# Patient Record
Sex: FEMALE | Race: WHITE | NOT HISPANIC OR LATINO | URBAN - METROPOLITAN AREA
[De-identification: names, ages, dates, MRNs, and addresses within clinical notes are randomized per-mention and may not be internally consistent; named-entity substitution may affect disease eponyms.]

---

## 2021-07-16 ENCOUNTER — OFFICE VISIT (OUTPATIENT)
Dept: URBAN - METROPOLITAN AREA CLINIC 98 | Facility: CLINIC | Age: 56
End: 2021-07-16
Payer: COMMERCIAL

## 2021-07-16 ENCOUNTER — WEB ENCOUNTER (OUTPATIENT)
Dept: URBAN - METROPOLITAN AREA CLINIC 98 | Facility: CLINIC | Age: 56
End: 2021-07-16

## 2021-07-16 VITALS
BODY MASS INDEX: 22.29 KG/M2 | HEIGHT: 67 IN | DIASTOLIC BLOOD PRESSURE: 78 MMHG | HEART RATE: 64 BPM | WEIGHT: 142 LBS | SYSTOLIC BLOOD PRESSURE: 127 MMHG | TEMPERATURE: 97.3 F

## 2021-07-16 DIAGNOSIS — N94.10 DYSPAREUNIA IN FEMALE: ICD-10-CM

## 2021-07-16 DIAGNOSIS — Q61.3 POLYCYSTIC KIDNEY DISEASE: ICD-10-CM

## 2021-07-16 DIAGNOSIS — Q44.6 PLD (POLYCYSTIC LIVER DISEASE): ICD-10-CM

## 2021-07-16 PROCEDURE — G9903 PT SCRN TBCO ID AS NON USER: HCPCS | Performed by: INTERNAL MEDICINE

## 2021-07-16 PROCEDURE — G8482 FLU IMMUNIZE ORDER/ADMIN: HCPCS | Performed by: INTERNAL MEDICINE

## 2021-07-16 PROCEDURE — 3017F COLORECTAL CA SCREEN DOC REV: CPT | Performed by: INTERNAL MEDICINE

## 2021-07-16 PROCEDURE — 1036F TOBACCO NON-USER: CPT | Performed by: INTERNAL MEDICINE

## 2021-07-16 PROCEDURE — G8427 DOCREV CUR MEDS BY ELIG CLIN: HCPCS | Performed by: INTERNAL MEDICINE

## 2021-07-16 PROCEDURE — G8420 CALC BMI NORM PARAMETERS: HCPCS | Performed by: INTERNAL MEDICINE

## 2021-07-16 PROCEDURE — 99243 OFF/OP CNSLTJ NEW/EST LOW 30: CPT | Performed by: INTERNAL MEDICINE

## 2021-07-16 RX ORDER — ESTRADIOL 10 UG/1
1 TABLET TABLET VAGINAL
Status: ACTIVE | COMMUNITY

## 2021-07-16 NOTE — HPI-TODAY'S VISIT:
Here on referral from Dr Saroj Stephens for abnormal liver imaging, a copy of this note will be sent to him upon completion.   3 yrs ago- had epigastric discomfort - felt a lump around xiphoid  did scan - diagnosed PKD  but not told of liver cysts until now  recently - with some increase in pain and discomfort in upper abdomen  no trouble eating; bm ok. . on Yuvafem x 2 months is helping with vaginal dryness however is having pain with sexual activity

## 2021-07-16 NOTE — PHYSICAL EXAM GASTROINTESTINAL
Abdomen, soft, nontender, nondistended, hepatomegaly with liver edge and cyst palpable in left upper quadrant and 1 cm below costal margin

## 2021-07-17 LAB
A/G RATIO: 2.2
ALBUMIN: 4.6
ALKALINE PHOSPHATASE: 90
ALT (SGPT): 17
AST (SGOT): 20
BASO (ABSOLUTE): 0
BASOS: 0
BILIRUBIN, TOTAL: 0.5
BUN/CREATININE RATIO: 18
BUN: 13
CALCIUM: 9.2
CARBON DIOXIDE, TOTAL: 25
CHLORIDE: 104
CREATININE: 0.74
EGFR IF AFRICN AM: 105
EGFR IF NONAFRICN AM: 91
EOS (ABSOLUTE): 0.1
EOS: 2
GGT: 56
GLOBULIN, TOTAL: 2.1
GLUCOSE: 85
HBSAG SCREEN: NEGATIVE
HCV AB: <0.1
HEMATOCRIT: 41.9
HEMATOLOGY COMMENTS:: (no result)
HEMOGLOBIN: 13.9
HEP A AB, TOTAL: NEGATIVE
HEP B CORE AB, TOT: NEGATIVE
HEPATITIS B SURF AB QUANT: <3.1
IMMATURE CELLS: (no result)
IMMATURE GRANS (ABS): 0
IMMATURE GRANULOCYTES: 0
INTERPRETATION:: (no result)
LYMPHS (ABSOLUTE): 1.7
LYMPHS: 37
MCH: 31.7
MCHC: 33.2
MCV: 95
MONOCYTES(ABSOLUTE): 0.4
MONOCYTES: 9
NEUTROPHILS (ABSOLUTE): 2.3
NEUTROPHILS: 52
NRBC: (no result)
PLATELETS: 182
POTASSIUM: 4.6
PROTEIN, TOTAL: 6.7
RBC: 4.39
RDW: 13.3
SODIUM: 142
WBC: 4.5

## 2021-07-29 ENCOUNTER — LAB OUTSIDE AN ENCOUNTER (OUTPATIENT)
Dept: URBAN - METROPOLITAN AREA CLINIC 98 | Facility: CLINIC | Age: 56
End: 2021-07-29

## 2021-07-29 LAB
CREATININE POC: 1
PERFORMING LAB: (no result)

## 2021-08-02 ENCOUNTER — TELEPHONE ENCOUNTER (OUTPATIENT)
Dept: URBAN - METROPOLITAN AREA CLINIC 98 | Facility: CLINIC | Age: 56
End: 2021-08-02

## 2022-10-04 ENCOUNTER — P2P PATIENT RECORD (OUTPATIENT)
Age: 57
End: 2022-10-04

## 2023-02-07 ENCOUNTER — LAB OUTSIDE AN ENCOUNTER (OUTPATIENT)
Dept: URBAN - METROPOLITAN AREA CLINIC 98 | Facility: CLINIC | Age: 58
End: 2023-02-07

## 2023-02-07 ENCOUNTER — OFFICE VISIT (OUTPATIENT)
Dept: URBAN - METROPOLITAN AREA CLINIC 98 | Facility: CLINIC | Age: 58
End: 2023-02-07
Payer: COMMERCIAL

## 2023-02-07 VITALS
BODY MASS INDEX: 23.45 KG/M2 | SYSTOLIC BLOOD PRESSURE: 141 MMHG | TEMPERATURE: 97.1 F | DIASTOLIC BLOOD PRESSURE: 76 MMHG | WEIGHT: 149.4 LBS | HEART RATE: 65 BPM | HEIGHT: 67 IN

## 2023-02-07 DIAGNOSIS — Q61.3 POLYCYSTIC KIDNEY DISEASE: ICD-10-CM

## 2023-02-07 DIAGNOSIS — Q44.6 PLD (POLYCYSTIC LIVER DISEASE): ICD-10-CM

## 2023-02-07 PROCEDURE — 99213 OFFICE O/P EST LOW 20 MIN: CPT | Performed by: INTERNAL MEDICINE

## 2023-02-07 RX ORDER — ESTRADIOL 10 UG/1
1 TABLET TABLET VAGINAL
Status: ACTIVE | COMMUNITY

## 2023-02-07 NOTE — HPI-TODAY'S VISIT:
Here to have her annual follow up for liver cysts no issues to report  pain when she leans over - mild and unchanged  slight early satiety at night had painful intercourse  OB GYN did US : no issues

## 2023-02-08 LAB
A/G RATIO: 2
ALBUMIN: 4.5
ALKALINE PHOSPHATASE: 90
ALT (SGPT): 21
AST (SGOT): 25
BASO (ABSOLUTE): 0
BASOS: 0
BILIRUBIN, TOTAL: 0.6
BUN/CREATININE RATIO: 18
BUN: 13
CALCIUM: 9.3
CARBON DIOXIDE, TOTAL: 25
CHLORIDE: 102
CREATININE: 0.74
EGFR: 94
EOS (ABSOLUTE): 0
EOS: 1
GLOBULIN, TOTAL: 2.2
GLUCOSE: 88
HEMATOCRIT: 41.4
HEMATOLOGY COMMENTS:: (no result)
HEMOGLOBIN: 13.9
IMMATURE CELLS: (no result)
IMMATURE GRANS (ABS): 0
IMMATURE GRANULOCYTES: 0
LYMPHS (ABSOLUTE): 2.4
LYMPHS: 34
MCH: 31.2
MCHC: 33.6
MCV: 93
MONOCYTES(ABSOLUTE): 0.6
MONOCYTES: 8
NEUTROPHILS (ABSOLUTE): 4.1
NEUTROPHILS: 57
NRBC: (no result)
PLATELETS: 206
POTASSIUM: 4
PROTEIN, TOTAL: 6.7
RBC: 4.45
RDW: 13.3
SODIUM: 139
WBC: 7.2

## 2023-02-09 ENCOUNTER — TELEPHONE ENCOUNTER (OUTPATIENT)
Dept: URBAN - METROPOLITAN AREA CLINIC 98 | Facility: CLINIC | Age: 58
End: 2023-02-09

## 2023-02-09 PROBLEM — 253883006: Status: ACTIVE | Noted: 2021-07-16

## 2023-02-09 PROBLEM — 81712001 PAIN IN FEMALE GENITALIA ON INTERCOURSE: Status: ACTIVE | Noted: 2023-02-05

## 2023-02-09 PROBLEM — 72925005 CONGENITAL CYSTIC DISEASE OF LIVER: Status: ACTIVE | Noted: 2023-02-05

## 2023-06-01 ENCOUNTER — P2P PATIENT RECORD (OUTPATIENT)
Age: 58
End: 2023-06-01

## 2024-02-09 ENCOUNTER — OV EP (OUTPATIENT)
Dept: URBAN - METROPOLITAN AREA CLINIC 98 | Facility: CLINIC | Age: 59
End: 2024-02-09
Payer: COMMERCIAL

## 2024-02-09 VITALS
WEIGHT: 148.4 LBS | HEIGHT: 67 IN | BODY MASS INDEX: 23.29 KG/M2 | TEMPERATURE: 97.1 F | DIASTOLIC BLOOD PRESSURE: 79 MMHG | SYSTOLIC BLOOD PRESSURE: 135 MMHG | HEART RATE: 69 BPM

## 2024-02-09 DIAGNOSIS — Q44.6 PLD (POLYCYSTIC LIVER DISEASE): ICD-10-CM

## 2024-02-09 DIAGNOSIS — Q61.3 POLYCYSTIC KIDNEY DISEASE: ICD-10-CM

## 2024-02-09 PROCEDURE — 99213 OFFICE O/P EST LOW 20 MIN: CPT | Performed by: INTERNAL MEDICINE

## 2024-02-09 RX ORDER — ESTRADIOL 10 UG/1
1 TABLET TABLET VAGINAL
Status: ACTIVE | COMMUNITY

## 2024-02-09 NOTE — PHYSICAL EXAM GASTROINTESTINAL
Abdomen, soft, nontender, epigastric distended, + hepatomegaly
Inadequate energy intake.../Loss of subcutaneous fat.../Loss of muscle...

## 2024-02-09 NOTE — HPI-TODAY'S VISIT:
Here to follow up for polycystic disease  no change in symptoms last mri imaging 2021  Seeing PA in [facility] now (Dr Perrin no longer in practice)  1/24/24 Labs done alt 28 ast 26 alp 84 alb 4.3 cr 0.83 na 143 plt 186 hb 13 no

## 2024-12-04 ENCOUNTER — LAB OUTSIDE AN ENCOUNTER (OUTPATIENT)
Dept: URBAN - METROPOLITAN AREA CLINIC 98 | Facility: CLINIC | Age: 59
End: 2024-12-04

## 2024-12-04 ENCOUNTER — WEB ENCOUNTER (OUTPATIENT)
Dept: URBAN - METROPOLITAN AREA CLINIC 98 | Facility: CLINIC | Age: 59
End: 2024-12-04

## 2024-12-17 ENCOUNTER — LAB OUTSIDE AN ENCOUNTER (OUTPATIENT)
Dept: URBAN - METROPOLITAN AREA CLINIC 98 | Facility: CLINIC | Age: 59
End: 2024-12-17

## 2024-12-18 ENCOUNTER — TELEPHONE ENCOUNTER (OUTPATIENT)
Dept: URBAN - METROPOLITAN AREA CLINIC 98 | Facility: CLINIC | Age: 59
End: 2024-12-18

## 2025-02-18 ENCOUNTER — P2P PATIENT RECORD (OUTPATIENT)
Age: 60
End: 2025-02-18

## 2025-02-21 ENCOUNTER — OFFICE VISIT (OUTPATIENT)
Dept: URBAN - METROPOLITAN AREA CLINIC 98 | Facility: CLINIC | Age: 60
End: 2025-02-21

## 2025-02-24 ENCOUNTER — OFFICE VISIT (OUTPATIENT)
Dept: URBAN - METROPOLITAN AREA CLINIC 98 | Facility: CLINIC | Age: 60
End: 2025-02-24
Payer: COMMERCIAL

## 2025-02-24 ENCOUNTER — DASHBOARD ENCOUNTERS (OUTPATIENT)
Age: 60
End: 2025-02-24

## 2025-02-24 VITALS
HEART RATE: 65 BPM | WEIGHT: 150 LBS | TEMPERATURE: 97.2 F | BODY MASS INDEX: 23.54 KG/M2 | DIASTOLIC BLOOD PRESSURE: 71 MMHG | SYSTOLIC BLOOD PRESSURE: 113 MMHG | HEIGHT: 67 IN

## 2025-02-24 DIAGNOSIS — Q44.6 PLD (POLYCYSTIC LIVER DISEASE): ICD-10-CM

## 2025-02-24 DIAGNOSIS — Q61.3 POLYCYSTIC KIDNEY DISEASE: ICD-10-CM

## 2025-02-24 PROCEDURE — 99213 OFFICE O/P EST LOW 20 MIN: CPT | Performed by: INTERNAL MEDICINE

## 2025-02-24 RX ORDER — ESTRADIOL 10 UG/1
1 TABLET TABLET VAGINAL
Status: ACTIVE | COMMUNITY

## 2025-02-24 NOTE — HPI-TODAY'S VISIT:
Here for annual follow up for polycystic liver disease  no complaints  leaning over to tie shoe : will have mild discomfort MRI [facility] 12/2024    IMPRESSION:        Innumerable large cystic lesions scattered throughout the liver, slightly more prominent than previous concerning for mild interval progression of polycystic liver disease.         No significant interval change in the bilateral polycystic kidneys. No definite enhancing renal lesion is identified. reviewed [facility] labs : 1/2025 : tbili 1.24 ; transaminases normal, alp wnl PCP Dr Cunningham